# Patient Record
Sex: FEMALE | Race: WHITE
[De-identification: names, ages, dates, MRNs, and addresses within clinical notes are randomized per-mention and may not be internally consistent; named-entity substitution may affect disease eponyms.]

---

## 2017-04-21 ENCOUNTER — HOSPITAL ENCOUNTER (OUTPATIENT)
Age: 72
Discharge: HOME | End: 2017-04-21
Payer: MEDICARE

## 2017-04-21 DIAGNOSIS — E03.9: ICD-10-CM

## 2017-04-21 DIAGNOSIS — E78.5: ICD-10-CM

## 2017-04-21 DIAGNOSIS — Z00.00: Primary | ICD-10-CM

## 2017-04-21 DIAGNOSIS — E87.1: ICD-10-CM

## 2017-04-21 DIAGNOSIS — I10: ICD-10-CM

## 2017-05-08 ENCOUNTER — HOSPITAL ENCOUNTER (OUTPATIENT)
Age: 72
Discharge: HOME | End: 2017-05-08
Payer: MEDICARE

## 2017-05-08 DIAGNOSIS — M85.89: ICD-10-CM

## 2017-05-08 DIAGNOSIS — Z13.820: Primary | ICD-10-CM

## 2017-07-09 ENCOUNTER — HOSPITAL ENCOUNTER (EMERGENCY)
Dept: HOSPITAL 76 - ED | Age: 72
Discharge: HOME | End: 2017-07-09
Payer: MEDICARE

## 2017-07-09 VITALS — SYSTOLIC BLOOD PRESSURE: 132 MMHG | DIASTOLIC BLOOD PRESSURE: 86 MMHG

## 2017-07-09 DIAGNOSIS — G35: ICD-10-CM

## 2017-07-09 DIAGNOSIS — S62.522A: Primary | ICD-10-CM

## 2017-07-09 DIAGNOSIS — F03.90: ICD-10-CM

## 2017-07-09 DIAGNOSIS — I10: ICD-10-CM

## 2017-07-09 DIAGNOSIS — S00.11XA: ICD-10-CM
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
## 2017-07-09 DIAGNOSIS — Z90.10: ICD-10-CM

## 2017-07-09 PROCEDURE — 29130 APPL FINGER SPLINT STATIC: CPT

## 2017-07-09 PROCEDURE — 99283 EMERGENCY DEPT VISIT LOW MDM: CPT

## 2017-07-09 PROCEDURE — 73140 X-RAY EXAM OF FINGER(S): CPT

## 2017-07-09 NOTE — ED PHYSICIAN DOCUMENTATION
History of Present Illness





- Stated complaint


Stated Complaint: LEFT THUMB/EYE INJ





- Chief complaint


Chief Complaint: General





- History obtained from


History obtained from: Patient





- History of Present Illness


Timing: Other (Fell down some stairs last night, mostly injured her left thumb 

which is quite painful and swollen but also has some scrapes about the face 

without visual deficit or headache.  No loss of consciousness.  Not 

anticoagulated.)





Review of Systems


Constitutional: denies: Fever, Chills


Ears: denies: Loss of hearing, Ear pain, Drainage/discharge


Nose: denies: Rhinorrhea / runny nose, Congestion, Epistaxis


Throat: reports: Reviewed and negative





PD PAST MEDICAL HISTORY





- Past Medical History


Cardiovascular: Hypertension


Neuro: Dementia, Multiple sclerosis, Other


Endocrine/Autoimmune: HyPOthyroidism


Psych: None





- Past Surgical History


/GYN: Mastectomy





- Present Medications


Home Medications: 


 Ambulatory Orders











 Medication  Instructions  Recorded  Confirmed


 


Anastrozole 1 mg PO DAILY 10/01/13 07/09/17


 


Diazepam 10 mg PO DAILY PRN 10/01/13 07/09/17


 


Donepezil [Aricept] 10 mg PO DAILY 10/01/13 07/09/17


 


Esomeprazole Magnesium [Nexium] 20 mg PO DAILY 10/01/13 07/09/17


 


Ipratropium Bromide 1 spray INH BID 10/01/13 07/09/17


 


Levothyroxine [Synthroid] 100 mcg PO DAILY 10/01/13 07/09/17


 


Nifedipine [Nifedipine Xl] 60 mg PO DAILY 10/01/13 07/09/17


 


Nortriptyline [Pamelor] 25 mg PO DAILY 10/01/13 07/09/17


 


Olmesartan Medoxomil [Benicar] 40 mg PO DAILY 10/01/13 07/09/17


 


Oxcarbazepine 1,350 mg PO DAILY 10/01/13 07/09/17


 


traMADol [Ultram] 50 mg PO Q4-6H PRN #15 tablet 07/09/17 














- Allergies


Allergies/Adverse Reactions: 


 Allergies











Allergy/AdvReac Type Severity Reaction Status Date / Time


 


No Known Drug Allergies Allergy   Unverified 04/22/14 10:01














- Social History


Does the pt smoke?: No


Smoking Status: Never smoker


Does the pt have substance abuse?: No





PD ED PE NORMAL





- Vitals


Vital signs reviewed: Yes





- General


General: Alert and oriented X 3, No acute distress





- HEENT


HEENT: Other (Small amount of abrasion and swelling lateral to the right eye 

without facial bony tenderness or limited extraocular movements.)





- Neck


Neck: Supple, no meningeal sign, No bony TTP





- Extremities


Extremities: Other (The entirety of the left thumb is very swollen and tender 

at the interphalangeal joint and very ecchymotic, a trephination with 

electrocautery is done of the nail during exam.)





- Neuro


Neuro: Alert and oriented X 3, Normal speech





- Psych


Psych: Normal mood, Normal affect





Results





- Vitals


Vitals: 


 Vital Signs - 24 hr











  07/09/17





  13:15


 


Temperature 36.3 C L


 


Heart Rate 74


 


Respiratory 18





Rate 


 


Blood Pressure 132/86 H


 


O2 Saturation 100








 Oxygen











O2 Source []                   Room air


 


O2 Source                      Room air

















- Rads (name of study)


  ** L thumb


Radiology: EMP read contemporaneously (Comminuted displaced distal phalanx 

fracture)





Procedures





- Splint (location)


  ** L thumb


Splint applied by: Tech


Type of splint: Metal foam finger splint


Other: Patient tolerated well, No complications, Neurovascular intact





PD MEDICAL DECISION MAKING





- ED course


ED course: 





The patient and family were counseled as to the diagnosis and need for follow-

up.  I counseled the patient with regard to signs and symptoms that would 

necessitate an urgent reevaluation in the emergency department.  They 

understand they are welcome to return at any time if worse or if not improving 

as expected.





This document was made in part using voice recognition software.  While efforts 

are made to proofread this documents, sound alike and grammatical errors may 

occur.





Departure





- Departure


Disposition: 01 Home, Self Care


Clinical Impression: 


Fracture of thumb, left, closed


Qualifiers:


 Encounter type: initial encounter Phalanx: distal Fracture alignment: 

displaced Qualified Code(s): S62.522A - Displaced fracture of distal phalanx of 

left thumb, initial encounter for closed fracture





Facial contusion


Qualifiers:


 Encounter type: initial encounter Qualified Code(s): S00.83XA - Contusion of 

other part of head, initial encounter


Condition: Good


Record reviewed to determine appropriate education?: Yes


Instructions:  ED Fx Finger Closed


Follow-Up: 


[facility] [Provider Group] - Within 1 week


Prescriptions: 


traMADol [Ultram] 50 mg PO Q4-6H PRN #15 tablet


 PRN Reason: Pain


Comments: 


You can take the splint off briefly to wash it but keep it on the rest of the 

time.  Follow-up with the orthopedist in 1 week.





Your blood pressure was elevated today on check into the emergency department.  

This does not mean that you have hypertension, it is a common phenomenon to 

come to the emergency department and have elevated blood pressure.  I recommend 

that she see her primary care physician within the week to have it rechecked 

when you are feeling better.


Discharge Date/Time: 07/09/17 13:48

## 2017-07-09 NOTE — XRAY PRELIMINARY REPORT
Accession: [id number]

Exam: XR Finger(s) LT

 

IMPRESSION: Comminuted fracture of the distal phalanx of the left thumb, with mild displacement

 

[facility] 

 

SITE ID: [id number]

## 2017-07-09 NOTE — XRAY REPORT
EXAM:

LEFT FIRST DIGIT RADIOGRAPHY 

 

EXAM DATE: 7/9/2017 01:48 PM.

 

CLINICAL HISTORY: Thumb inj.

 

COMPARISON: None.

 

TECHNIQUE: 3 views.

 

FINDINGS: 

Bones: There is a comminuted fracture of the distal phalanx of the left thumb. Mild displacement.

 

Joints: Osteoarthritis.

 

Soft Tissues: Soft tissue swelling. No radiopaque foreign body.

 

IMPRESSION: Comminuted fracture of the distal phalanx of the left thumb, with mild displacement

 

[facility] 

Referring Provider Line: [phone]

 

SITE ID: [id number]

## 2017-09-28 ENCOUNTER — HOSPITAL ENCOUNTER (OUTPATIENT)
Dept: HOSPITAL 76 - SDS | Age: 72
Discharge: HOME | End: 2017-09-28
Attending: SURGERY
Payer: MEDICARE

## 2017-09-28 VITALS — SYSTOLIC BLOOD PRESSURE: 118 MMHG | DIASTOLIC BLOOD PRESSURE: 68 MMHG

## 2017-09-28 DIAGNOSIS — Z87.891: ICD-10-CM

## 2017-09-28 DIAGNOSIS — Z85.3: ICD-10-CM

## 2017-09-28 DIAGNOSIS — K64.8: ICD-10-CM

## 2017-09-28 DIAGNOSIS — E78.5: ICD-10-CM

## 2017-09-28 DIAGNOSIS — M35.9: ICD-10-CM

## 2017-09-28 DIAGNOSIS — Z79.82: ICD-10-CM

## 2017-09-28 DIAGNOSIS — G35: ICD-10-CM

## 2017-09-28 DIAGNOSIS — Z87.19: ICD-10-CM

## 2017-09-28 DIAGNOSIS — Z12.11: Primary | ICD-10-CM

## 2017-09-28 DIAGNOSIS — K57.30: ICD-10-CM

## 2017-09-28 DIAGNOSIS — I10: ICD-10-CM

## 2017-09-28 PROCEDURE — 0DJD8ZZ INSPECTION OF LOWER INTESTINAL TRACT, VIA NATURAL OR ARTIFICIAL OPENING ENDOSCOPIC: ICD-10-PCS | Performed by: SURGERY

## 2018-07-28 ENCOUNTER — HOSPITAL ENCOUNTER (EMERGENCY)
Dept: HOSPITAL 76 - ED | Age: 73
Discharge: HOME | End: 2018-07-28
Payer: MEDICARE

## 2018-07-28 VITALS — SYSTOLIC BLOOD PRESSURE: 132 MMHG | DIASTOLIC BLOOD PRESSURE: 84 MMHG
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
## 2018-07-28 DIAGNOSIS — E03.9: ICD-10-CM

## 2018-07-28 DIAGNOSIS — G35: ICD-10-CM

## 2018-07-28 DIAGNOSIS — S70.12XA: Primary | ICD-10-CM

## 2018-07-28 DIAGNOSIS — I10: ICD-10-CM

## 2018-07-28 PROCEDURE — 99283 EMERGENCY DEPT VISIT LOW MDM: CPT

## 2018-07-28 NOTE — ED PHYSICIAN DOCUMENTATION
History of Present Illness





- Stated complaint


Stated Complaint: LUMP LEFT LEG





- Chief complaint


Chief Complaint: General





- History obtained from


History obtained from: Patient





- History of Present Illness


Timing: How many weeks ago (2)


Pain level max: 2


Pain level now: 1


Improved by: nothing


Worsened by: palpation





- Additonal information


Additional information: 





Patient gives herself injections into her legs for her multiple sclerosis, she 

states that she has a hematoma to the L thigh. No redness. Mild swelling. NVI. 





Review of Systems


Constitutional: denies: Fever, Chills


GI: denies: Vomiting


Skin: denies: Rash





PD PAST MEDICAL HISTORY





- Past Medical History


Cardiovascular: Hypertension


Endocrine/Autoimmune: HyPOthyroidism


Psych: None





- Past Surgical History


/GYN: Mastectomy





- Present Medications


Home Medications: 


 Ambulatory Orders











 Medication  Instructions  Recorded  Confirmed


 


Diazepam 10 mg PO DAILY PRN 10/01/13 09/28/17


 


Donepezil [Aricept] 10 mg PO DAILY 10/01/13 09/28/17


 


Esomeprazole Magnesium [Nexium] 20 mg PO DAILY 10/01/13 09/28/17


 


Ipratropium Bromide 1 spray INH BID 10/01/13 09/28/17


 


Levothyroxine [Synthroid] 100 mcg PO DAILY 10/01/13 09/28/17


 


Nifedipine [Nifedipine Xl] 60 mg PO DAILY 10/01/13 09/28/17


 


Nortriptyline [Pamelor] 25 mg PO DAILY 10/01/13 09/28/17


 


Olmesartan Medoxomil [Benicar] 40 mg PO DAILY 10/01/13 09/28/17


 


Oxcarbazepine 1,050 mg PO DAILY 10/01/13 09/28/17


 


Olmesartan Medoxomil [Benicar]  07/28/18 














- Allergies


Allergies/Adverse Reactions: 


 Allergies











Allergy/AdvReac Type Severity Reaction Status Date / Time


 


No Known Drug Allergies Allergy   Unverified 07/28/18 09:41














- Social History


Does the pt smoke?: No


Smoking Status: Never smoker


Does the pt have substance abuse?: No





PD ED PE NORMAL





- Vitals


Vital signs reviewed: Yes





- General


General: Alert and oriented X 3, No acute distress





- HEENT


HEENT: Moist mucous membranes





- Derm


Derm: Warm and dry





- Extremities


Extremities: Other (L thigh - slight ecchymosis to mid, lateral thigh. no 

swelling, no redness. no drainage. )





- Neuro


Neuro: Alert and oriented X 3





- Psych


Psych: Normal mood, Normal affect





Results





- Vitals


Vitals: 


 Vital Signs - 24 hr











  07/28/18





  09:35


 


Temperature 36.4 C L


 


Heart Rate 66


 


Respiratory 16





Rate 


 


Blood Pressure 132/84 H


 


O2 Saturation 99








 Oxygen











O2 Source [Without Activity]   Room air


 


O2 Source                      Room air

















PD MEDICAL DECISION MAKING





- ED course


Complexity details: considered differential, d/w patient


ED course: 





Patient is a 73-year-old female with slight ecchymosis and hematoma to the left 

thigh after an injection. No signs of infection.  Ace bandage applied for 

compression.  We will have her remove it tomorrow.  We will have her follow-up 

with her doctor for further care.  Patient counseled regarding signs and 

symptoms for which I believe and urgent re-evaluation would be necessary. 

Patient with good understanding of and agreement to plan and is comfortable 

going home at this time





This document was made in part using voice recognition software. While efforts 

are made to proofread this document, sound alike and grammatical errors may 

occur.





- Sepsis Event


Vital Signs: 


 Vital Signs - 24 hr











  07/28/18





  09:35


 


Temperature 36.4 C L


 


Heart Rate 66


 


Respiratory 16





Rate 


 


Blood Pressure 132/84 H


 


O2 Saturation 99








 Oxygen











O2 Source [Without Activity]   Room air


 


O2 Source                      Room air

















Departure





- Departure


Disposition: 01 Home, Self Care


Clinical Impression: 


 Hematoma





Condition: Good


Instructions:  ED Hematoma


Follow-Up: 


Tiffani Chakraborty PA-C [Primary Care Provider] - Within 1 week (for wound check)


Comments: 


Wear the Ace bandage for the next 24 hours.  Return if you worsen.  This should 

improve over the next few days.


Discharge Date/Time: 07/28/18 09:55

## 2024-01-09 ENCOUNTER — OFFICE VISIT (OUTPATIENT)
Dept: URBAN - METROPOLITAN AREA CLINIC 51 | Facility: CLINIC | Age: 79
End: 2024-01-09
Payer: MEDICARE

## 2024-01-09 DIAGNOSIS — H04.123 TEAR FILM INSUFFICIENCY OF BILATERAL LACRIMAL GLANDS: ICD-10-CM

## 2024-01-09 DIAGNOSIS — H35.373 PUCKERING OF MACULA, BILATERAL: ICD-10-CM

## 2024-01-09 DIAGNOSIS — S02.122A: ICD-10-CM

## 2024-01-09 DIAGNOSIS — H47.20 UNSPECIFIED OPTIC ATROPHY: ICD-10-CM

## 2024-01-09 DIAGNOSIS — H40.1134 PRIMARY OPEN-ANGLE GLAUCOMA, INDETERMINATE, BILATERAL: ICD-10-CM

## 2024-01-09 DIAGNOSIS — Z91.81 HISTORY OF FALLING: Primary | ICD-10-CM

## 2024-01-09 DIAGNOSIS — H52.4 PRESBYOPIA: ICD-10-CM

## 2024-01-09 PROCEDURE — 92133 CPTRZD OPH DX IMG PST SGM ON: CPT | Performed by: OPTOMETRIST

## 2024-01-09 PROCEDURE — 99204 OFFICE O/P NEW MOD 45 MIN: CPT | Performed by: OPTOMETRIST

## 2024-01-09 PROCEDURE — 92134 CPTRZ OPH DX IMG PST SGM RTA: CPT | Performed by: OPTOMETRIST

## 2024-01-09 ASSESSMENT — INTRAOCULAR PRESSURE
OS: 16
OD: 16

## 2024-01-11 ENCOUNTER — OFFICE VISIT (OUTPATIENT)
Dept: URBAN - METROPOLITAN AREA CLINIC 44 | Facility: CLINIC | Age: 79
End: 2024-01-11
Payer: MEDICARE

## 2024-01-11 PROCEDURE — 99204 OFFICE O/P NEW MOD 45 MIN: CPT | Performed by: STUDENT IN AN ORGANIZED HEALTH CARE EDUCATION/TRAINING PROGRAM
